# Patient Record
Sex: MALE | Race: WHITE | NOT HISPANIC OR LATINO | Employment: FULL TIME | URBAN - METROPOLITAN AREA
[De-identification: names, ages, dates, MRNs, and addresses within clinical notes are randomized per-mention and may not be internally consistent; named-entity substitution may affect disease eponyms.]

---

## 2019-07-25 ENCOUNTER — COMMUNICATION - HEALTHEAST (OUTPATIENT)
Dept: INTERNAL MEDICINE | Facility: CLINIC | Age: 54
End: 2019-07-25

## 2019-07-25 DIAGNOSIS — Z00.00 ROUTINE GENERAL MEDICAL EXAMINATION AT A HEALTH CARE FACILITY: ICD-10-CM

## 2019-08-15 ENCOUNTER — AMBULATORY - HEALTHEAST (OUTPATIENT)
Dept: LAB | Facility: CLINIC | Age: 54
End: 2019-08-15

## 2019-08-15 DIAGNOSIS — Z00.00 ROUTINE GENERAL MEDICAL EXAMINATION AT A HEALTH CARE FACILITY: ICD-10-CM

## 2019-08-15 LAB
CHOLEST SERPL-MCNC: 238 MG/DL
FASTING STATUS PATIENT QL REPORTED: YES
FASTING STATUS PATIENT QL REPORTED: YES
GLUCOSE BLD-MCNC: 101 MG/DL (ref 70–99)
HDLC SERPL-MCNC: 62 MG/DL
LDLC SERPL CALC-MCNC: 148 MG/DL
PSA SERPL-MCNC: 1.8 NG/ML (ref 0–3.5)
TRIGL SERPL-MCNC: 138 MG/DL

## 2019-08-16 ENCOUNTER — OFFICE VISIT - HEALTHEAST (OUTPATIENT)
Dept: INTERNAL MEDICINE | Facility: CLINIC | Age: 54
End: 2019-08-16

## 2019-08-16 DIAGNOSIS — K40.90 LEFT INGUINAL HERNIA: ICD-10-CM

## 2019-08-16 DIAGNOSIS — Z00.00 ROUTINE GENERAL MEDICAL EXAMINATION AT A HEALTH CARE FACILITY: ICD-10-CM

## 2019-08-16 ASSESSMENT — MIFFLIN-ST. JEOR: SCORE: 1748.75

## 2021-05-30 ENCOUNTER — RECORDS - HEALTHEAST (OUTPATIENT)
Dept: ADMINISTRATIVE | Facility: CLINIC | Age: 56
End: 2021-05-30

## 2021-05-30 NOTE — TELEPHONE ENCOUNTER
Who is calling:  Ken  Reason for Call:  Patient would like to know if they can come and do labs day before physical on 8/16/19.   Date of last appointment with primary care:  7/14/16  Has the patient been recently seen:  No  Okay to leave a detailed message: Yes, please call to advise

## 2021-05-30 NOTE — TELEPHONE ENCOUNTER
Left voicemail for patient to return call to clinic. When patient returns call, please give them below message.    Please help patient schedule lab only appointment a couple days before physical. Lab orders are in.  Pati Sethi CMA ............... 3:10 PM, 07/25/19

## 2021-05-31 ENCOUNTER — RECORDS - HEALTHEAST (OUTPATIENT)
Dept: ADMINISTRATIVE | Facility: CLINIC | Age: 56
End: 2021-05-31

## 2021-05-31 NOTE — TELEPHONE ENCOUNTER
Left detailed voicemail for patient to call back and schedule lab appointment.  Pati Sethi CMA ............... 3:20 PM, 08/06/19

## 2021-05-31 NOTE — PROGRESS NOTES
"ASSESSMENT and PLAN: Healthcare maintenance along with the followin.  Left inguinal hernia.  We discussed the pathophysiology of these.  I recommended surgical correction of this which she is open to.  He was referred to [facility].  Follow-up with us as needed.    2.  Healthcare maintenance.  Colonoscopy is up-to-date.  Vaccinations are up-to-date.  PSA, lipid profile, and glucose were checked prior to the visit and were acceptable.  Follow-up 1 year, earlier if needed    Problem List Items Addressed This Visit     None          There are no Patient Instructions on file for this visit.    There are no discontinued medications.    No follow-ups on file.    CHIEF COMPLAINT:  Chief Complaint   Patient presents with     Annual Exam     not fasting - no concerns        HISTORY OF PRESENT ILLNESS:  Ken Wilson is a 54 y.o. male  presenting to the clinic today for a physical exam as well as to reestablish care.  It has been just over 3 years since I have seen him last.  He states that he has not had any changes in his medical history or surgical history.  No new medications.  He states that he has been having a \"bulge\" in his left groin for approximately the last year.  It is not painful but he states that it has been growing a bit over this timeframe.  He is wondering if he needs to get it fixed.        REVIEW OF SYSTEMS:   Pertinent positives noted in HPI, remainder of ROS is negative.    MEDICATIONS:  No current outpatient medications on file.     No current facility-administered medications for this visit.        TOBACCO USE:  Social History     Tobacco Use   Smoking Status Never Smoker   Smokeless Tobacco Never Used       VITALS:  Vitals:    19 1326 19 1340   BP: (!) 144/91 128/88   Pulse: 75    Weight: 199 lb (90.3 kg)    Height: 5' 11.25\" (1.81 m)      Wt Readings from Last 3 Encounters:   19 199 lb (90.3 kg)   16 199 lb 3.2 oz (90.4 kg)   16 206 lb 6.4 oz " (93.6 kg)         PHYSICAL EXAM:  Constitutional:  Reveals an alert, pleasant male.   HEET: Normocephalic, without obvious abnormality, atraumatic. PERRL, conjunctiva/corneas clear, EOM's intact. External canals, TMs clear.   Neurologic: Normal gait and station  Psychologic: Normal affect  : Testes are descended bilaterally.  There is a obvious inguinal hernia present on the left.  None on the right.

## 2021-05-31 NOTE — TELEPHONE ENCOUNTER
Left voicemail for patient to return call to clinic. When patient returns call, please give them below message.    Pati Sethi CMA ............... 9:23 AM, 07/31/19

## 2021-06-03 VITALS — WEIGHT: 199 LBS | BODY MASS INDEX: 27.86 KG/M2 | HEIGHT: 71 IN

## 2021-08-22 ENCOUNTER — HEALTH MAINTENANCE LETTER (OUTPATIENT)
Age: 56
End: 2021-08-22

## 2021-10-17 ENCOUNTER — HEALTH MAINTENANCE LETTER (OUTPATIENT)
Age: 56
End: 2021-10-17

## 2022-05-10 ENCOUNTER — OFFICE VISIT (OUTPATIENT)
Dept: INTERNAL MEDICINE | Facility: CLINIC | Age: 57
End: 2022-05-10
Payer: COMMERCIAL

## 2022-05-10 VITALS
SYSTOLIC BLOOD PRESSURE: 148 MMHG | BODY MASS INDEX: 28.54 KG/M2 | OXYGEN SATURATION: 97 % | WEIGHT: 206.1 LBS | HEART RATE: 64 BPM | DIASTOLIC BLOOD PRESSURE: 98 MMHG

## 2022-05-10 DIAGNOSIS — K14.6 TONGUE PAIN: Primary | ICD-10-CM

## 2022-05-10 PROCEDURE — 99214 OFFICE O/P EST MOD 30 MIN: CPT | Performed by: INTERNAL MEDICINE

## 2022-05-10 NOTE — PROGRESS NOTES
Assessment & Plan   Problem List Items Addressed This Visit    None     Visit Diagnoses     Tongue pain    -  Primary           Patient presenting with 2 months of tongue pain.  I reassured him that I did not see any gross abnormalities in the mouth nor did I feel any enlarged lymph nodes in the neck.  I told him that I did not feel that his tongue pain represented a systemic or autoimmune illness as he is not having any other symptoms.  I told him to get evaluated by his dentist as they are going to know much more than I would as an internist in regards to head, neck, and mouth problems.  Follow-up with us in 1 to 2 months for his routine physical exam.    No follow-ups on file.    Sterling Gaytan MD  [facility]   Ken is a 56 year old who comes in today for evaluation of tongue pain.  It has been about 3 years since I have seen Ken.  He states that this is been going on for about the last 2 months.  He states that he has pain more so when he eats, and describes the pain as a burning sensation.  He will occasionally have trouble swallowing because of it.  No fevers or other abnormalities.  He denies any skin rashes or joint pains.  He is concerned that his symptoms may represent an underlying autoimmune condition or other systemic illness and thus he sought attention today.  He sees his dentist on a regular basis.      Objective    BP (!) 148/98 (BP Location: Right arm, Patient Position: Sitting, Cuff Size: Adult Large)   Pulse 64   Wt 93.5 kg (206 lb 1.6 oz)   SpO2 97%   BMI 28.54 kg/m    Body mass index is 28.54 kg/m .  Physical Exam     HEENT: Mouth, tongue, and posterior pharynx were examined thoroughly.  I see no masses or other gross abnormalities.  No anterior posterior cervical lymphadenopathy.

## 2022-10-01 ENCOUNTER — HEALTH MAINTENANCE LETTER (OUTPATIENT)
Age: 57
End: 2022-10-01

## 2023-10-21 ENCOUNTER — HEALTH MAINTENANCE LETTER (OUTPATIENT)
Age: 58
End: 2023-10-21

## 2024-12-08 ENCOUNTER — HEALTH MAINTENANCE LETTER (OUTPATIENT)
Age: 59
End: 2024-12-08